# Patient Record
Sex: FEMALE | Race: WHITE | NOT HISPANIC OR LATINO
[De-identification: names, ages, dates, MRNs, and addresses within clinical notes are randomized per-mention and may not be internally consistent; named-entity substitution may affect disease eponyms.]

---

## 2022-05-31 ENCOUNTER — APPOINTMENT (OUTPATIENT)
Dept: ORTHOPEDIC SURGERY | Facility: CLINIC | Age: 72
End: 2022-05-31
Payer: MEDICARE

## 2022-05-31 VITALS — BODY MASS INDEX: 24.11 KG/M2 | HEIGHT: 66 IN | WEIGHT: 150 LBS

## 2022-05-31 PROCEDURE — 99214 OFFICE O/P EST MOD 30 MIN: CPT

## 2022-06-01 NOTE — DATA REVIEWED
[MRI] : MRI [I independently reviewed and interpreted images and report] : I independently reviewed and interpreted images and report [FreeTextEntry1] : RI lumbosacral spine done February 21, 2022 was reviewed. There is mild to moderate right paracentral disc/osteophyte at L2-3 with mild to moderate stenosis. Mild to moderate stenosis at L3-4. Mild stenosis at L4-5

## 2022-06-01 NOTE — DISCUSSION/SUMMARY
[Medication Risks Reviewed] : Medication risks reviewed [de-identified] : The patient did not want to resume formal PT\par She will continue home exercises\par Moist heat p.r.n.\par Tylenol p.r.n. Advil p.r.n.\par If she is not improving, she can try Medrol Dosepak.  She will stop Advil when taking Medrol Dosepak.  I explained to her how to take the medication.  Not to take any NSAIDs when taking this.  She may resume Advil the day after she finishes Medrol Dosepak\par \par Impression:\par Lumbosacral strain/spondylosis/stenosis

## 2022-06-01 NOTE — PHYSICAL EXAM
[Normal Mood and Affect] : normal mood and affect [Able to Communicate] : able to communicate [Well Developed] : well developed [Well Nourished] : well nourished [de-identified] : Normal gait [FreeTextEntry3] : Inspection lumbosacral spine normal [FreeTextEntry8] : Mild tenderness paraspinals left lumbosacral region [de-identified] : Straight leg raising is positive for lower back pain at 70° on the left [Bilateral] : foot and ankle bilaterally [] : no calf tenderness [2+] : posterior tibialis pulse: 2+

## 2022-06-01 NOTE — HISTORY OF PRESENT ILLNESS
[Lower back] : lower back [Left Leg] : left leg [Right Leg] : right leg [Gradual] : gradual [9] : 9 [Dull/Aching] : dull/aching [Radiating] : radiating [Sharp] : sharp [Intermittent] : intermittent [Leisure] : leisure [Retired] : Work status: retired [de-identified] : The patient has had recurrence of pain in her lower back over the past 10 days.  No injury.  She has mild to moderate pain when standing and walking.  Occasional pain radiating down her left leg.  No numbness or weakness.  No loss of bowel bladder control.  She has been doing home exercises.  Taking Advil p.r.n. [] : Post Surgical Visit: no [FreeTextEntry7] : B Thighs  [de-identified] : 2/28/2022 [de-identified] : Dr. Blanco

## 2022-06-20 ENCOUNTER — FORM ENCOUNTER (OUTPATIENT)
Age: 72
End: 2022-06-20

## 2022-06-21 ENCOUNTER — APPOINTMENT (OUTPATIENT)
Dept: ORTHOPEDIC SURGERY | Facility: CLINIC | Age: 72
End: 2022-06-21
Payer: MEDICARE

## 2022-06-21 ENCOUNTER — APPOINTMENT (OUTPATIENT)
Dept: MRI IMAGING | Facility: CLINIC | Age: 72
End: 2022-06-21
Payer: MEDICARE

## 2022-06-21 VITALS — BODY MASS INDEX: 24.11 KG/M2 | WEIGHT: 150 LBS | HEIGHT: 66 IN

## 2022-06-21 PROCEDURE — 72148 MRI LUMBAR SPINE W/O DYE: CPT | Mod: MH

## 2022-06-21 PROCEDURE — 99213 OFFICE O/P EST LOW 20 MIN: CPT

## 2022-06-21 RX ORDER — OMEPRAZOLE 40 MG/1
40 CAPSULE, DELAYED RELEASE ORAL
Qty: 30 | Refills: 0 | Status: ACTIVE | COMMUNITY
Start: 2022-05-05

## 2022-06-21 RX ORDER — BENAZEPRIL HYDROCHLORIDE AND HYDROCHLOROTHIAZIDE 10; 12.5 MG/1; MG/1
10-12.5 TABLET, FILM COATED ORAL
Qty: 90 | Refills: 0 | Status: ACTIVE | COMMUNITY
Start: 2022-05-29

## 2022-06-21 RX ORDER — IPRATROPIUM BROMIDE 42 UG/1
0.06 SPRAY NASAL
Qty: 15 | Refills: 0 | Status: ACTIVE | COMMUNITY
Start: 2022-06-03

## 2022-06-21 RX ORDER — METHYLPREDNISOLONE 4 MG/1
4 TABLET ORAL
Qty: 1 | Refills: 0 | Status: COMPLETED | COMMUNITY
Start: 2022-05-31 | End: 2022-06-21

## 2022-06-21 RX ORDER — MONTELUKAST 10 MG/1
10 TABLET, FILM COATED ORAL
Qty: 90 | Refills: 0 | Status: ACTIVE | COMMUNITY
Start: 2022-05-29

## 2022-06-21 RX ORDER — CONJUGATED ESTROGENS 0.62 MG/G
0.62 CREAM VAGINAL
Qty: 30 | Refills: 0 | Status: ACTIVE | COMMUNITY
Start: 2022-03-22

## 2022-06-21 RX ORDER — SIMVASTATIN 20 MG/1
20 TABLET, FILM COATED ORAL
Qty: 90 | Refills: 0 | Status: ACTIVE | COMMUNITY
Start: 2022-03-19

## 2022-06-21 NOTE — DISCUSSION/SUMMARY
[Medication Risks Reviewed] : Medication risks reviewed [de-identified] : The patient did not want to resume formal PT\par She will continue home exercises\par Moist heat p.r.n.\par Tylenol p.r.n. Advil p.r.n.\par Discontinue methocarbamol\par She can try cyclobenzaprine 5-10 mg q.8 h. p.r.n. pain/spasm.  Not to take this if she is driving or needs to be alert\par She will have new MRI lumbosacral spine\par She is tentatively scheduled for epidural injections with Dr. Mone Pa on June 29\par \par Impression:\par Lumbosacral strain/spondylosis/stenosis

## 2022-06-21 NOTE — PHYSICAL EXAM
[Normal Mood and Affect] : normal mood and affect [Able to Communicate] : able to communicate [Well Developed] : well developed [Well Nourished] : well nourished [de-identified] : Normal gait [FreeTextEntry3] : Inspection lumbosacral spine normal [FreeTextEntry8] : Mild tenderness paraspinals lumbosacral region [de-identified] : Straight leg raising is Negative bilaterally [Bilateral] : foot and ankle bilaterally [] : no calf tenderness [2+] : posterior tibialis pulse: 2+

## 2022-06-21 NOTE — HISTORY OF PRESENT ILLNESS
[Gradual] : gradual [9] : 9 [Radiating] : radiating [Intermittent] : intermittent [Leisure] : leisure [Rest] : rest [Walking] : walking [Retired] : Work status: retired [de-identified] : The patient says she has been worse since last visit.  She has mild to moderate pain in her lower back radiating down her legs.  Pain standing and walking.  Pain when lying down.  Occasional awakening from sleep at night.  No numbness or weakness or lower extremities.  Doing home exercises.  Minimal improvement after Medrol Dosepak.  Taking Advil p.r.n.  Taking methocarbamol at nighttime which has not been helping her.  She saw pain management physician Dr. Mone Pa.  She is scheduled for epidural injection June 29 [] : Post Surgical Visit: no [FreeTextEntry7] : B Legs

## 2022-06-22 ENCOUNTER — NON-APPOINTMENT (OUTPATIENT)
Age: 72
End: 2022-06-22

## 2022-06-23 ENCOUNTER — APPOINTMENT (OUTPATIENT)
Dept: ORTHOPEDIC SURGERY | Facility: CLINIC | Age: 72
End: 2022-06-23
Payer: MEDICARE

## 2022-06-23 VITALS — BODY MASS INDEX: 24.11 KG/M2 | WEIGHT: 150 LBS | HEIGHT: 66 IN

## 2022-06-23 PROCEDURE — 99214 OFFICE O/P EST MOD 30 MIN: CPT

## 2022-06-23 NOTE — HISTORY OF PRESENT ILLNESS
[Lower back] : lower back [Gradual] : gradual [5] : 5 [Dull/Aching] : dull/aching [Intermittent] : intermittent [Retired] : Work status: retired [de-identified] : The patient has continued pain in her lower back when standing and walking.  Pain when bending and sitting.  Pain radiating to her right leg.  No numbness or weakness in her lower extremities.  She had new MRI lumbosacral spine.  She has been taking Tylenol and Advil p.r.n.  She says cyclobenzaprine has not really been helping her [] : Post Surgical Visit: no [FreeTextEntry7] : B Legs  [de-identified] : Getting up from sitting [de-identified] : MRI

## 2022-06-23 NOTE — PHYSICAL EXAM
[Normal Mood and Affect] : normal mood and affect [Able to Communicate] : able to communicate [Well Developed] : well developed [Well Nourished] : well nourished [de-identified] : Normal gait [FreeTextEntry3] : Inspection lumbosacral spine normal [FreeTextEntry8] : Mild tenderness paraspinals lumbosacral region [de-identified] : Straight leg raising is Positive for lower back pain at 70° on the right [Bilateral] : foot and ankle bilaterally [] : no calf tenderness [2+] : posterior tibialis pulse: 2+

## 2022-06-23 NOTE — DATA REVIEWED
[MRI] : MRI [Lumbar Spine] : lumbar spine [FreeTextEntry1] : MRI of lumbosacral spine done June 21, 2022 was reviewed. There is moderate right paracentral disc extrusion reported as measuring 15 x 8 x 7 mm impinging thecal sac with moderate to severe stenosis at L2-3. Mild to moderate stenosis at L3-4. Mild stenosis at L4-5. Grade 1 retrolisthesis L5-S1

## 2022-06-23 NOTE — DISCUSSION/SUMMARY
[Medication Risks Reviewed] : Medication risks reviewed [de-identified] : MRI lumbosacral spine was discussed with the patient\par The patient did not want to resume formal PT\par She will continue home exercises\par Moist heat p.r.n.\par Tylenol p.r.n. Advil p.r.n.\par Discontinue cyclobenzaprine which has not been helping her\par She can try Tizanidine 2 mg 1-2 q.8 h. p.r.n. pain/spasm.  Not to take this if she is driving or needs to be alert\par She is tentatively scheduled for epidural injections with Dr. Mone Pa on June 29.  She says she is going to proceed with this\par Recommend she have spine consult.  She has seen Dr. Aleksandar Glasgow and Dr. Gary Barnett in the past.  I also gave her the name of Dr. Gary Wallace for consultation\par If for any reason she develops any significant neurologic changes, weakness and lower extremities, loss of bowel bladder control, advised to go to emergency room immediately\par \par Impression:\par Lumbosacral strain/spondylosis/stenosis

## 2022-07-20 ENCOUNTER — APPOINTMENT (OUTPATIENT)
Dept: ORTHOPEDIC SURGERY | Facility: CLINIC | Age: 72
End: 2022-07-20

## 2022-07-20 DIAGNOSIS — M54.17 RADICULOPATHY, LUMBOSACRAL REGION: ICD-10-CM

## 2022-07-20 PROCEDURE — 99205 OFFICE O/P NEW HI 60 MIN: CPT

## 2022-07-20 PROCEDURE — 99215 OFFICE O/P EST HI 40 MIN: CPT

## 2022-07-20 NOTE — HISTORY OF PRESENT ILLNESS
[Lower back] : lower back [Localized] : localized [Intermittent] : intermittent [Gradual] : gradual [Sudden] : sudden [6] : 6 [5] : 5 [Burning] : burning [Shooting] : shooting [Stabbing] : stabbing [Exercising] : exercising [de-identified] : 7/20/22: 71 yo LHD f here for the evaluation of her low back NC from DR Blanco - She has had chronic back pain for years treated by Dr Blanco. Pain worsened recently with pain shooting down her legs about 6-7 weeks ago without injury. Pain was with bending/standing/walking. Pain improved recently after LESI with pain management. No loss of b/b control. No n/t\par \par Had MBB (Dr Pa - Pain management) in 6/2021 with some relief. Had LESI in 7/2022 with 75-80% improvement. \par Had PT in 2/2022 with some relief. Also had MDP\par No prior spine surgeries/Chirocare/acupuncture\par \par Hx L knee TKA 2019\par Meniscus surgery in 2007 and 2009 \par Cataract surgery 2020\par \par Hx hypertension, hyperlipidemia, GERD and chronic rhinitis, asthma\par \par No hx diabetes/cancer\par \par Work: Retired, Volunteer at [employer] \par \par xrays reviewed:\par AP PELVIS - slight asymmtry of the hips\par L spine - spondylsosis with loss of disc hieght L4-S1\par \par  MRI L-spine: 6/21/22\par 1. L2-3: New right central and subarticular disc herniation/extrusion measuring approximately 15 x 8 x 7 mm\par impinging the right thecal sac causing moderate to severe central canal stenosis and effacing the right lateral \par recess. Herniated disc material extends 15 mm superiorly along the posterior aspect of L2. This is significantly \par increased in size from previous exam. Superimposed disc bulge which causes moderate bilateral neuroforaminal \par stenosis.\par 2. Additional herniations and bulges, detailed above. [] : Post Surgical Visit: no [FreeTextEntry1] : L spine  [FreeTextEntry5] : Pt has a hx of back pain, pt has seen dr salmon in the past, pt had mris done as well as a recent epidural with relief  [FreeTextEntry7] : down into both legs  [de-identified] : Dr Blanco  [de-identified] : x rays and mri done at [facility]  [de-identified] : None  [TWNoteComboBox1] : 90%

## 2022-07-20 NOTE — DISCUSSION/SUMMARY
[de-identified] : reviewed the case with her \par \par acute of chronic disc herniation - working through conservative tx with PT/INJECTOINS/MEDICATION - IF PAIN RECURRED WOULD consider for L2-3 laminectomy (possible fusion) - as it stands today - wouldn’t rec surgery

## 2022-07-20 NOTE — REVIEW OF SYSTEMS
[Joint Pain] : joint pain [Joint Stiffness] : joint stiffness [Negative] : Heme/Lymph Quality 130: Documentation Of Current Medications In The Medical Record: Current Medications Documented Detail Level: Detailed

## 2022-08-10 ENCOUNTER — APPOINTMENT (OUTPATIENT)
Dept: ORTHOPEDIC SURGERY | Facility: CLINIC | Age: 72
End: 2022-08-10

## 2022-08-10 VITALS — BODY MASS INDEX: 24.11 KG/M2 | HEIGHT: 66 IN | WEIGHT: 150 LBS

## 2022-08-10 DIAGNOSIS — M54.16 RADICULOPATHY, LUMBAR REGION: ICD-10-CM

## 2022-08-10 DIAGNOSIS — M51.26 OTHER INTERVERTEBRAL DISC DISPLACEMENT, LUMBAR REGION: ICD-10-CM

## 2022-08-10 PROCEDURE — 99214 OFFICE O/P EST MOD 30 MIN: CPT

## 2022-08-10 RX ORDER — ALBUTEROL SULFATE 90 UG/1
108 (90 BASE) INHALANT RESPIRATORY (INHALATION)
Qty: 8 | Refills: 0 | Status: ACTIVE | COMMUNITY
Start: 2022-08-07

## 2022-08-10 RX ORDER — CYCLOBENZAPRINE HYDROCHLORIDE 5 MG/1
5 TABLET, FILM COATED ORAL
Qty: 60 | Refills: 1 | Status: DISCONTINUED | COMMUNITY
Start: 2022-06-21 | End: 2022-08-10

## 2022-08-10 RX ORDER — TIZANIDINE 2 MG/1
2 TABLET ORAL
Qty: 60 | Refills: 1 | Status: DISCONTINUED | COMMUNITY
Start: 2022-06-23 | End: 2022-08-10

## 2022-08-10 NOTE — HISTORY OF PRESENT ILLNESS
[Lower back] : lower back [Gradual] : gradual [5] : 5 [Dull/Aching] : dull/aching [Intermittent] : intermittent [Leisure] : leisure [Rest] : rest [Standing] : standing [Walking] : walking [Retired] : Work status: retired [de-identified] : The patient had epidural injection with Dr. Mone Pa on June 29.  She has been feeling a little better.  She saw Dr. Barnett and Dr. Wallace.\par She is currently on prednisone for URI/bronchitis\par She has mild pain in her lower back with when standing and walking.  Occasional pain radiating to her legs.  No numbness or weakness.  Doing home exercises [] : Post Surgical Visit: no [FreeTextEntry7] : B Legs

## 2022-08-10 NOTE — IMAGING
[de-identified] : Normal gait\par \par Lumbosacral spine\par Inspection normal\par Mild tenderness paraspinals bilaterally\par Straight leg raising is mildly positive for lower back pain at 70° bilaterally\par Motor exam lower extremities normal\par \par Hips\par No pain with passive motion\par \par Legs\par No swelling\par Calves are soft and nontender\par Posterior tibial pulse 2+ bilaterally

## 2022-08-10 NOTE — HISTORY OF PRESENT ILLNESS
[Lower back] : lower back [Gradual] : gradual [5] : 5 [Dull/Aching] : dull/aching [Intermittent] : intermittent [Leisure] : leisure [Rest] : rest [Standing] : standing [Walking] : walking [Retired] : Work status: retired [de-identified] : The patient had epidural injection with Dr. Mone Pa on June 29.  She has been feeling a little better.  She saw Dr. Barnett and Dr. Wallace.\par She is currently on prednisone for URI/bronchitis\par She has mild pain in her lower back with when standing and walking.  Occasional pain radiating to her legs.  No numbness or weakness.  Doing home exercises [] : Post Surgical Visit: no [FreeTextEntry7] : B Legs

## 2022-08-10 NOTE — HISTORY OF PRESENT ILLNESS
[Lower back] : lower back [Gradual] : gradual [5] : 5 [Dull/Aching] : dull/aching [Intermittent] : intermittent [Leisure] : leisure [Rest] : rest [Standing] : standing [Walking] : walking [Retired] : Work status: retired [de-identified] : The patient had epidural injection with Dr. Mone Pa on June 29.  She has been feeling a little better.  She saw Dr. Barnett and Dr. Wallace.\par She is currently on prednisone for URI/bronchitis\par She has mild pain in her lower back with when standing and walking.  Occasional pain radiating to her legs.  No numbness or weakness.  Doing home exercises [] : Post Surgical Visit: no [FreeTextEntry7] : B Legs

## 2022-08-10 NOTE — IMAGING
[de-identified] : Normal gait\par \par Lumbosacral spine\par Inspection normal\par Mild tenderness paraspinals bilaterally\par Straight leg raising is mildly positive for lower back pain at 70° bilaterally\par Motor exam lower extremities normal\par \par Hips\par No pain with passive motion\par \par Legs\par No swelling\par Calves are soft and nontender\par Posterior tibial pulse 2+ bilaterally

## 2022-08-10 NOTE — DISCUSSION/SUMMARY
[Medication Risks Reviewed] : Medication risks reviewed [de-identified] : Various options were discussed with the patient\par She will continue home exercises.  She may try chiropractic treatment with her chiropractor Dr. Robledo\par She can resume Advil the day after she finishes prednisone\par Tylenol p.r.n.\par She can try gabapentin 300 mg to 600 mg q.h.s.\par Continue pain management followup with Dr. Mone Woo for possible second epidural\par If for any reason she develops any significant neurologic changes, weakness in lower extremities, loss of bowel bladder control, advised to go to emergency room immediately\par \par Impression:\par Number sacral strain/spondylosis/stenosis/disc herniation

## 2022-08-10 NOTE — DISCUSSION/SUMMARY
[Medication Risks Reviewed] : Medication risks reviewed [de-identified] : Various options were discussed with the patient\par She will continue home exercises.  She may try chiropractic treatment with her chiropractor Dr. Robledo\par She can resume Advil the day after she finishes prednisone\par Tylenol p.r.n.\par She can try gabapentin 300 mg to 600 mg q.h.s.\par Continue pain management followup with Dr. Mone Woo for possible second epidural\par If for any reason she develops any significant neurologic changes, weakness in lower extremities, loss of bowel bladder control, advised to go to emergency room immediately\par \par Impression:\par Number sacral strain/spondylosis/stenosis/disc herniation

## 2022-08-10 NOTE — DISCUSSION/SUMMARY
[Medication Risks Reviewed] : Medication risks reviewed [de-identified] : Various options were discussed with the patient\par She will continue home exercises.  She may try chiropractic treatment with her chiropractor Dr. Robledo\par She can resume Advil the day after she finishes prednisone\par Tylenol p.r.n.\par She can try gabapentin 300 mg to 600 mg q.h.s.\par Continue pain management followup with Dr. Mone Woo for possible second epidural\par If for any reason she develops any significant neurologic changes, weakness in lower extremities, loss of bowel bladder control, advised to go to emergency room immediately\par \par Impression:\par Number sacral strain/spondylosis/stenosis/disc herniation

## 2022-08-29 DIAGNOSIS — M47.817 SPONDYLOSIS W/OUT MYELOPATHY OR RADICULOPATHY, LUMBOSACRAL REGION: ICD-10-CM

## 2022-08-29 DIAGNOSIS — M48.061 SPINAL STENOSIS, LUMBAR REGION WITHOUT NEUROGENIC CLAUDICATION: ICD-10-CM

## 2022-09-12 ENCOUNTER — APPOINTMENT (OUTPATIENT)
Dept: ORTHOPEDIC SURGERY | Facility: CLINIC | Age: 72
End: 2022-09-12

## 2023-05-31 NOTE — IMAGING
[de-identified] : Normal gait\par \par Lumbosacral spine\par Inspection normal\par Mild tenderness paraspinals bilaterally\par Straight leg raising is mildly positive for lower back pain at 70° bilaterally\par Motor exam lower extremities normal\par \par Hips\par No pain with passive motion\par \par Legs\par No swelling\par Calves are soft and nontender\par Posterior tibial pulse 2+ bilaterally No respiratory distress. No stridor, Lungs sounds clear with good aeration bilaterally.

## 2023-06-27 ENCOUNTER — APPOINTMENT (OUTPATIENT)
Dept: ORTHOPEDIC SURGERY | Facility: CLINIC | Age: 73
End: 2023-06-27
Payer: MEDICARE

## 2023-06-27 VITALS — WEIGHT: 149 LBS | BODY MASS INDEX: 23.95 KG/M2 | HEIGHT: 66 IN

## 2023-06-27 PROCEDURE — 99214 OFFICE O/P EST MOD 30 MIN: CPT

## 2023-06-27 PROCEDURE — 73562 X-RAY EXAM OF KNEE 3: CPT | Mod: RT

## 2023-06-27 RX ORDER — AZITHROMYCIN 250 MG/1
250 TABLET, FILM COATED ORAL
Qty: 6 | Refills: 0 | Status: COMPLETED | COMMUNITY
Start: 2022-08-07 | End: 2023-06-27

## 2023-06-27 RX ORDER — GABAPENTIN 300 MG/1
300 CAPSULE ORAL
Qty: 60 | Refills: 1 | Status: COMPLETED | COMMUNITY
Start: 2022-08-10 | End: 2023-06-27

## 2023-06-27 RX ORDER — PREDNISONE 20 MG/1
20 TABLET ORAL
Qty: 5 | Refills: 0 | Status: COMPLETED | COMMUNITY
Start: 2022-08-07 | End: 2023-06-27

## 2023-06-27 NOTE — HISTORY OF PRESENT ILLNESS
[Gradual] : gradual [3] : 3 [Dull/Aching] : dull/aching [Intermittent] : intermittent [Stairs] : stairs [de-identified] : The patient has had increased pain right knee over the past several months.  She has mild to moderate pain standing and walking.  Pain with stairs and movie sign.  Doing home exercises.  Taking Tylenol as needed.  She did have some improvement in the past after Euflexxa injections.  She had left total knee replacement by Dr. Aman Flaherty and is doing well [] : Post Surgical Visit: no [FreeTextEntry7] : R Leg [de-identified] : 11/25/20 [de-identified] : Dr. Blanco [de-identified] : 11/25/20

## 2023-06-27 NOTE — DISCUSSION/SUMMARY
[de-identified] : Various options were discussed with the patient.  I discussed right total knee replacement.\par She says her symptoms have been getting worse.\par Recommend she schedule consultation with Dr. Francisco Wilkes\par Ice as needed\par Tylenol as needed\par Continue home exercises\par \par Impression:\par Severe osteoarthritis right knee

## 2023-06-27 NOTE — IMAGING
[de-identified] : Slight right antalgic gait\par \par Right knee\par No swelling\par Mild medial facet and joint line tenderness\par Passive range of motion 0 degrees to 120 degrees\par Ligaments are stable\par Quad strength 5/5\par \par Right leg\par No swelling\par Calf is soft and nontender\par Posterior tibial pulse 2+ [Right] : right knee [FreeTextEntry9] : Reviewed and interpreted.  Right knee AP standing, lateral, sunrise views-severe degenerative changes with narrowing of the medial compartment on AP standing view, spurring patellofemoral joint

## 2023-07-27 ENCOUNTER — OFFICE (OUTPATIENT)
Dept: URBAN - METROPOLITAN AREA CLINIC 109 | Facility: CLINIC | Age: 73
Setting detail: OPHTHALMOLOGY
End: 2023-07-27
Payer: MEDICARE

## 2023-07-27 DIAGNOSIS — H25.89: ICD-10-CM

## 2023-07-27 DIAGNOSIS — H16.223: ICD-10-CM

## 2023-07-27 DIAGNOSIS — H02.832: ICD-10-CM

## 2023-07-27 DIAGNOSIS — H02.831: ICD-10-CM

## 2023-07-27 DIAGNOSIS — D23.122: ICD-10-CM

## 2023-07-27 DIAGNOSIS — H02.834: ICD-10-CM

## 2023-07-27 DIAGNOSIS — H02.835: ICD-10-CM

## 2023-07-27 PROCEDURE — 92014 COMPRE OPH EXAM EST PT 1/>: CPT | Performed by: OPHTHALMOLOGY

## 2023-07-27 ASSESSMENT — REFRACTION_AUTOREFRACTION
OS_AXIS: 22
OD_SPHERE: +0.50
OD_CYLINDER: -0.75
OD_AXIS: 164
OS_SPHERE: +1.00
OS_CYLINDER: -1.50

## 2023-07-27 ASSESSMENT — REFRACTION_MANIFEST
OD_SPHERE: +0.50
OS_VPRISM_DIRECTION: BO
OD_ADD: +2.75
OD_CYLINDER: -0.75
OD_AXIS: 153
OS_VA1: 20/20-
OS_HPRISM: 3.5
OS_CYLINDER: -1.50
OS_AXIS: 25
OD_VA1: 20/20
OD_HPRISM: 3.5
OD_VPRISM_DIRECTION: BO
OS_ADD: +2.75
OS_SPHERE: +1.00

## 2023-07-27 ASSESSMENT — REFRACTION_CURRENTRX
OS_HPRISM: 3.5
OD_AXIS: 120
OD_SPHERE: +0.25
OD_CYLINDER: -1.00
OS_OVR_VA: 20/
OD_OVR_VA: 20/
OD_ADD: +2.75
OS_HPRISM_DIRECTION: BO
OS_SPHERE: +0.75
OD_HPRISM_DIRECTION: BO
OD_HPRISM: 3.5
OS_CYLINDER: -1.00
OS_ADD: +2.75
OS_AXIS: 60

## 2023-07-27 ASSESSMENT — LID POSITION - DERMATOCHALASIS
OD_DERMATOCHALASIS: RLL RUL 1+
OS_DERMATOCHALASIS: LLL LUL 1+

## 2023-07-27 ASSESSMENT — SUPERFICIAL PUNCTATE KERATITIS (SPK)
OD_SPK: ABSENT
OS_SPK: ABSENT

## 2023-07-27 ASSESSMENT — KERATOMETRY
OS_K2POWER_DIOPTERS: 43.75
OS_AXISANGLE_DEGREES: 113
OD_K2POWER_DIOPTERS: 43.62
OS_K1POWER_DIOPTERS: 42.25
OD_K1POWER_DIOPTERS: 42.87
OD_AXISANGLE_DEGREES: 71

## 2023-07-27 ASSESSMENT — SPHEQUIV_DERIVED
OD_SPHEQUIV: 0.125
OD_SPHEQUIV: 0.125
OS_SPHEQUIV: 0.25
OS_SPHEQUIV: 0.25

## 2023-07-27 ASSESSMENT — AXIALLENGTH_DERIVED
OD_AL: 23.637
OD_AL: 23.637
OS_AL: 23.6785
OS_AL: 23.6785

## 2023-07-27 ASSESSMENT — VISUAL ACUITY
OD_BCVA: 20/20
OS_BCVA: 20/20

## 2023-07-27 ASSESSMENT — CONFRONTATIONAL VISUAL FIELD TEST (CVF)
OD_FINDINGS: FULL
OS_FINDINGS: FULL

## 2023-07-27 ASSESSMENT — TONOMETRY
OS_IOP_MMHG: 14
OD_IOP_MMHG: 13

## 2023-08-10 ENCOUNTER — APPOINTMENT (OUTPATIENT)
Dept: ORTHOPEDIC SURGERY | Facility: CLINIC | Age: 73
End: 2023-08-10
Payer: MEDICARE

## 2023-08-10 DIAGNOSIS — Z78.9 OTHER SPECIFIED HEALTH STATUS: ICD-10-CM

## 2023-08-10 DIAGNOSIS — M48.00 SPINAL STENOSIS, SITE UNSPECIFIED: ICD-10-CM

## 2023-08-10 DIAGNOSIS — J45.909 UNSPECIFIED ASTHMA, UNCOMPLICATED: ICD-10-CM

## 2023-08-10 DIAGNOSIS — E78.00 PURE HYPERCHOLESTEROLEMIA, UNSPECIFIED: ICD-10-CM

## 2023-08-10 DIAGNOSIS — Z87.891 PERSONAL HISTORY OF NICOTINE DEPENDENCE: ICD-10-CM

## 2023-08-10 DIAGNOSIS — M17.11 UNILATERAL PRIMARY OSTEOARTHRITIS, RIGHT KNEE: ICD-10-CM

## 2023-08-10 DIAGNOSIS — M19.90 UNSPECIFIED OSTEOARTHRITIS, UNSPECIFIED SITE: ICD-10-CM

## 2023-08-10 DIAGNOSIS — I10 ESSENTIAL (PRIMARY) HYPERTENSION: ICD-10-CM

## 2023-08-10 PROCEDURE — 99213 OFFICE O/P EST LOW 20 MIN: CPT | Mod: 25

## 2023-08-10 PROCEDURE — 20610 DRAIN/INJ JOINT/BURSA W/O US: CPT | Mod: RT

## 2023-08-10 NOTE — PHYSICAL EXAM
[Right] : right knee [de-identified] : Constitutional: The patient appears well developed, well nourished. Examination of patients ability to communicate functionally was normal.       Neurologic: Coordination is normal. Alert and oriented to time, place and person. No evidence of mood disorder, calm affect.        RIGHT     KNEE  : Inspection of the knee is as follows: moderate effusion. no ecchymosis, no streaking, no erythema, no atrophy, no deformities of the quad tendon and no deformities of patellar tendon.   VARUS ALIGNMENT     Palpation of the knee is as follows: medial joint line tenderness,  MILD  medial facet of patella tenderness, lateral facet of patella tenderness and crepitus. no palpable masses and no increased warmth.       Knee Range of Motion is as follows in degrees:        Extension: 2    Flexion: 125        Strength examination of the knee is as follows:    Quadriceps strength is 5/5    Hamstring strength is 5/5       Ligament Stability and Special Test ligamentously stable, negative anterior draw, negative Lachman test, negative posterior draw and no varus or valgus instability.    negative McMurrays test and able to do active straight leg raise without an extensor lag.       Neurological examination of the knee is as follows: light touch is intact throughout.       Gait and function is as follows: mildly antalgic gait.  [FreeTextEntry9] : ap/lat/sunrise taken approx 2 mos ago show mod to severe medial joint space narrowing spurring, and patellar spurring.

## 2023-08-10 NOTE — DISCUSSION/SUMMARY
[de-identified] : DIscussed options were discussed including MRI Right knee CSI, lubricant injection,  TKA vs MUKA  Large joint injection was performed of the right  knee. The indication for this procedure was pain, inflammation and x-ray evidence of Osteoarthritis on this or prior visit. The site was prepped with betadine and sterile technique used.An injection of Lidocaine 5cc of 1%  was used Betamethasone (Celestone) 1cc of 6mg.  Patient tolerated procedure well. Patient was advised to call if redness, pain or fever occur and apply ice for 15 minutes out of every hour for the next 12-24 hours as tolerated.   Patient has tried OTC's including aspirin, Ibuprofen, Aleve, etc or prescription NSAIDS, and/or exercises at home and/or physical therapy without satisfactory response, patient had decreased mobility in the joint and the risks benefits, and alternatives have been discussed, and verbal consent was obtained.  Pt to return in 1 month if needed Entered by Becky Lakhani acting as scribe. Dr. Wilkes Attestation The documentation recorded by the scribe, in my presence, accurately reflects the service I, Dr. Wilkes, personally performed, and the decisions made by me with my edits as appropriate.

## 2023-08-10 NOTE — HISTORY OF PRESENT ILLNESS
[de-identified] : Patient is here today for her RT Knee. Pt state NKI, it is something that developed over time.  Right knee scoped by Dr Blanco circa 2007 or 2009 which helped for awhile.  She had Left TKA 4 yrs Dr Flaherty at [facility].  She states the knee is doing well.  She has had Right knee CSI? and lubricant injections with no significant relief. She notes most pain medial aspect of the knee and she cant hop on the knee and she feels its weak.  She wants to consider trx before she becomes unable due to age or function.

## 2024-04-02 ENCOUNTER — OFFICE (OUTPATIENT)
Dept: URBAN - METROPOLITAN AREA CLINIC 109 | Facility: CLINIC | Age: 74
Setting detail: OPHTHALMOLOGY
End: 2024-04-02
Payer: MEDICARE

## 2024-04-02 DIAGNOSIS — H02.834: ICD-10-CM

## 2024-04-02 DIAGNOSIS — H25.89: ICD-10-CM

## 2024-04-02 DIAGNOSIS — H02.832: ICD-10-CM

## 2024-04-02 DIAGNOSIS — H16.222: ICD-10-CM

## 2024-04-02 DIAGNOSIS — D23.122: ICD-10-CM

## 2024-04-02 DIAGNOSIS — H02.835: ICD-10-CM

## 2024-04-02 DIAGNOSIS — H16.221: ICD-10-CM

## 2024-04-02 DIAGNOSIS — H16.223: ICD-10-CM

## 2024-04-02 DIAGNOSIS — H02.831: ICD-10-CM

## 2024-04-02 PROCEDURE — 83861 MICROFLUID ANALY TEARS: CPT | Mod: QW,LT | Performed by: OPHTHALMOLOGY

## 2024-04-02 PROCEDURE — 83861 MICROFLUID ANALY TEARS: CPT | Mod: QW,RT | Performed by: OPHTHALMOLOGY

## 2024-04-02 PROCEDURE — 99213 OFFICE O/P EST LOW 20 MIN: CPT | Performed by: OPHTHALMOLOGY

## 2024-04-02 ASSESSMENT — LID POSITION - DERMATOCHALASIS
OS_DERMATOCHALASIS: LLL LUL 1+
OD_DERMATOCHALASIS: RLL RUL 1+

## 2024-07-09 ENCOUNTER — OFFICE (OUTPATIENT)
Dept: URBAN - METROPOLITAN AREA CLINIC 109 | Facility: CLINIC | Age: 74
Setting detail: OPHTHALMOLOGY
End: 2024-07-09
Payer: MEDICARE

## 2024-07-09 DIAGNOSIS — H25.89: ICD-10-CM

## 2024-07-09 DIAGNOSIS — H02.832: ICD-10-CM

## 2024-07-09 DIAGNOSIS — H02.831: ICD-10-CM

## 2024-07-09 DIAGNOSIS — H02.834: ICD-10-CM

## 2024-07-09 DIAGNOSIS — D23.122: ICD-10-CM

## 2024-07-09 DIAGNOSIS — H02.835: ICD-10-CM

## 2024-07-09 DIAGNOSIS — H16.223: ICD-10-CM

## 2024-07-09 PROCEDURE — 99213 OFFICE O/P EST LOW 20 MIN: CPT | Performed by: OPHTHALMOLOGY

## 2024-07-09 ASSESSMENT — CONFRONTATIONAL VISUAL FIELD TEST (CVF)
OD_FINDINGS: FULL
OS_FINDINGS: FULL

## 2024-07-09 ASSESSMENT — LID POSITION - DERMATOCHALASIS
OD_DERMATOCHALASIS: RLL RUL 1+
OS_DERMATOCHALASIS: LLL LUL 1+

## 2024-07-11 ENCOUNTER — APPOINTMENT (OUTPATIENT)
Dept: ORTHOPEDIC SURGERY | Facility: CLINIC | Age: 74
End: 2024-07-11
Payer: MEDICARE

## 2024-07-11 VITALS — WEIGHT: 149 LBS | BODY MASS INDEX: 23.95 KG/M2 | HEIGHT: 66 IN

## 2024-07-11 DIAGNOSIS — M54.16 RADICULOPATHY, LUMBAR REGION: ICD-10-CM

## 2024-07-11 PROCEDURE — 73502 X-RAY EXAM HIP UNI 2-3 VIEWS: CPT

## 2024-07-11 PROCEDURE — 72100 X-RAY EXAM L-S SPINE 2/3 VWS: CPT

## 2024-07-11 PROCEDURE — 99214 OFFICE O/P EST MOD 30 MIN: CPT

## 2024-07-11 RX ORDER — VENLAFAXINE HCL 75 MG
75 TABLET ORAL
Refills: 0 | Status: ACTIVE | COMMUNITY

## 2024-07-11 RX ORDER — LOSARTAN POTASSIUM AND HYDROCHLOROTHIAZIDE 12.5; 1 MG/1; MG/1
100-12.5 TABLET ORAL
Refills: 0 | Status: ACTIVE | COMMUNITY

## 2024-07-15 ENCOUNTER — APPOINTMENT (OUTPATIENT)
Dept: MRI IMAGING | Facility: CLINIC | Age: 74
End: 2024-07-15
Payer: MEDICARE

## 2024-07-15 PROCEDURE — 72148 MRI LUMBAR SPINE W/O DYE: CPT | Mod: MH

## 2024-07-23 ENCOUNTER — APPOINTMENT (OUTPATIENT)
Dept: ORTHOPEDIC SURGERY | Facility: CLINIC | Age: 74
End: 2024-07-23
Payer: MEDICARE

## 2024-07-23 VITALS — HEIGHT: 66 IN | WEIGHT: 149 LBS | BODY MASS INDEX: 23.95 KG/M2

## 2024-07-23 DIAGNOSIS — M47.817 SPONDYLOSIS W/OUT MYELOPATHY OR RADICULOPATHY, LUMBOSACRAL REGION: ICD-10-CM

## 2024-07-23 DIAGNOSIS — M48.061 SPINAL STENOSIS, LUMBAR REGION WITHOUT NEUROGENIC CLAUDICATION: ICD-10-CM

## 2024-07-23 DIAGNOSIS — S39.012D STRAIN OF MUSCLE, FASCIA AND TENDON OF LOWER BACK, SUBSEQUENT ENCOUNTER: ICD-10-CM

## 2024-07-23 PROCEDURE — 99214 OFFICE O/P EST MOD 30 MIN: CPT

## 2024-07-23 NOTE — HISTORY OF PRESENT ILLNESS
[Lower back] : lower back [Right Leg] : right leg [Gradual] : gradual [9] : 9 [Radiating] : radiating [Intermittent] : intermittent [Leisure] : leisure [Rest] : rest [Walking] : walking [Retired] : Work status: retired [de-identified] : The patient has continued mild to moderate pain in her right lower back region.  Pain with change in position.  No radicular complaints.  No numbness or weakness in her lower extremities.  She had MRI lumbosacral spine [] : Post Surgical Visit: no [de-identified] : 7/11/24 [FreeTextEntry7] : B Legs [de-identified] : Dr. Blanco  [de-identified] : 7/8/2024 [de-identified] : MRI

## 2024-07-23 NOTE — DISCUSSION/SUMMARY
[Medication Risks Reviewed] : Medication risks reviewed [de-identified] : MRI lumbosacral spine was discussed with the patient Continue PT at [facility] She will continue home exercises Moist heat p.r.n. Tylenol p.r.n. Advil p.r.n. The patient has had epidural injections in the past with Dr. Mone Pa.  I discussed possible follow-up consult after MRI is performed If for any reason she develops any significant neurologic changes, weakness in the lower extremities, loss of bowel or bladder control, advised to go to emergency room immediately Follow-up 2 months if any persistent problems, otherwise prn  Impression: Lumbosacral strain/spondylosis/stenosis

## 2024-07-23 NOTE — IMAGING
[FreeTextEntry1] : Reviewed and interpreted.  Lumbosacral spine AP and lateral views-moderate to space narrowing at L4-5.  Severe to space narrowing at L5-S1 with grade 1 retrolisthesis [de-identified] : Reviewed and interpreted.  Pelvis AP with lateral right hip-minimal degenerative changes of the hips bilaterally [de-identified] : Slight right antalgic gait  Lumbosacral spine Inspection-normal Tenderness-mild tenderness right lumbosacral region Straight leg raising-negative bilaterally Motor exam lower extremities-normal  Right hip Full painless passive range of motion. No tenderness  Left hip Full painless passive range of motion. No tenderness  Right knee No swelling.  Healed midline incision Passive range of motion 3 degrees to 110 degrees Ligaments are stable  Both legs No swelling Calves are soft and nontender Posterior tibial pulse 2+ bilaterally

## 2024-07-23 NOTE — DATA REVIEWED
[MRI] : MRI [Lumbar Spine] : lumbar spine [I independently reviewed and interpreted images and report] : I independently reviewed and interpreted images and report [FreeTextEntry1] : MRI lumbosacral spine done July 15, 2024 was reviewed. There is multilevel degenerative disc disease. Moderate stenosis L2-3 and L3-4. Mild to moderate stenosis L4-5 with reactive endplate changes. Mild central disc herniation at L5-S1, also reported as epidural lipomatosis.

## 2024-08-14 ENCOUNTER — OFFICE (OUTPATIENT)
Dept: URBAN - METROPOLITAN AREA CLINIC 104 | Facility: CLINIC | Age: 74
Setting detail: OPHTHALMOLOGY
End: 2024-08-14
Payer: MEDICARE

## 2024-08-14 ENCOUNTER — RX ONLY (RX ONLY)
Age: 74
End: 2024-08-14

## 2024-08-14 DIAGNOSIS — H02.825: ICD-10-CM

## 2024-08-14 PROCEDURE — 67840 REMOVE EYELID LESION: CPT | Mod: LT | Performed by: OPHTHALMOLOGY

## 2024-08-14 PROCEDURE — 92285 EXTERNAL OCULAR PHOTOGRAPHY: CPT | Performed by: OPHTHALMOLOGY

## 2024-08-14 ASSESSMENT — LID POSITION - DERMATOCHALASIS
OD_DERMATOCHALASIS: RLL RUL 1+
OS_DERMATOCHALASIS: LLL LUL 1+

## 2025-02-06 ENCOUNTER — OFFICE (OUTPATIENT)
Dept: URBAN - METROPOLITAN AREA CLINIC 109 | Facility: CLINIC | Age: 75
Setting detail: OPHTHALMOLOGY
End: 2025-02-06
Payer: MEDICARE

## 2025-02-06 DIAGNOSIS — H02.825: ICD-10-CM

## 2025-02-06 PROCEDURE — 99213 OFFICE O/P EST LOW 20 MIN: CPT | Performed by: OPHTHALMOLOGY

## 2025-02-06 ASSESSMENT — SUPERFICIAL PUNCTATE KERATITIS (SPK)
OS_SPK: T 1+
OD_SPK: T

## 2025-02-06 ASSESSMENT — VISUAL ACUITY
OS_BCVA: 20/20-1
OD_BCVA: 20/20-2

## 2025-02-06 ASSESSMENT — REFRACTION_AUTOREFRACTION
OS_SPHERE: +1.00
OD_CYLINDER: -0.75
OS_CYLINDER: -1.50
OD_AXIS: 160
OS_AXIS: 40
OD_SPHERE: +0.75

## 2025-02-06 ASSESSMENT — LID POSITION - DERMATOCHALASIS
OD_DERMATOCHALASIS: RLL RUL 1+
OS_DERMATOCHALASIS: LLL LUL 1+

## 2025-02-06 ASSESSMENT — KERATOMETRY
OD_K2POWER_DIOPTERS: 43.62
OS_AXISANGLE_DEGREES: 113
OS_K2POWER_DIOPTERS: 43.75
OD_K1POWER_DIOPTERS: 42.87
OS_K1POWER_DIOPTERS: 42.25
OD_AXISANGLE_DEGREES: 71

## 2025-04-30 ENCOUNTER — APPOINTMENT (OUTPATIENT)
Dept: ORTHOPEDIC SURGERY | Facility: CLINIC | Age: 75
End: 2025-04-30
Payer: MEDICARE

## 2025-04-30 DIAGNOSIS — M47.817 SPONDYLOSIS W/OUT MYELOPATHY OR RADICULOPATHY, LUMBOSACRAL REGION: ICD-10-CM

## 2025-04-30 DIAGNOSIS — S39.012D STRAIN OF MUSCLE, FASCIA AND TENDON OF LOWER BACK, SUBSEQUENT ENCOUNTER: ICD-10-CM

## 2025-04-30 PROCEDURE — 72100 X-RAY EXAM L-S SPINE 2/3 VWS: CPT

## 2025-04-30 PROCEDURE — 99214 OFFICE O/P EST MOD 30 MIN: CPT

## 2025-04-30 PROCEDURE — 73502 X-RAY EXAM HIP UNI 2-3 VIEWS: CPT

## 2025-04-30 RX ORDER — TIZANIDINE 2 MG/1
2 TABLET ORAL
Qty: 60 | Refills: 1 | Status: ACTIVE | COMMUNITY
Start: 2025-04-30 | End: 1900-01-01

## 2025-04-30 RX ORDER — METHYLPREDNISOLONE 4 MG/1
4 TABLET ORAL
Qty: 1 | Refills: 0 | Status: ACTIVE | COMMUNITY
Start: 2025-04-30 | End: 1900-01-01

## 2025-05-01 ENCOUNTER — NON-APPOINTMENT (OUTPATIENT)
Age: 75
End: 2025-05-01

## 2025-07-24 ENCOUNTER — APPOINTMENT (OUTPATIENT)
Dept: ORTHOPEDIC SURGERY | Facility: CLINIC | Age: 75
End: 2025-07-24
Payer: MEDICARE

## 2025-07-24 VITALS — HEIGHT: 66 IN | BODY MASS INDEX: 23.95 KG/M2 | WEIGHT: 149 LBS

## 2025-07-24 DIAGNOSIS — S16.1XXA STRAIN OF MUSCLE, FASCIA AND TENDON AT NECK LEVEL, INITIAL ENCOUNTER: ICD-10-CM

## 2025-07-24 DIAGNOSIS — M47.812 SPONDYLOSIS W/OUT MYELOPATHY OR RADICULOPATHY, CERVICAL REGION: ICD-10-CM

## 2025-07-24 PROCEDURE — 99213 OFFICE O/P EST LOW 20 MIN: CPT

## 2025-07-24 PROCEDURE — 72050 X-RAY EXAM NECK SPINE 4/5VWS: CPT

## 2025-07-28 ENCOUNTER — APPOINTMENT (OUTPATIENT)
Dept: MRI IMAGING | Facility: CLINIC | Age: 75
End: 2025-07-28
Payer: MEDICARE

## 2025-07-28 PROCEDURE — 72141 MRI NECK SPINE W/O DYE: CPT

## 2025-07-29 ENCOUNTER — NON-APPOINTMENT (OUTPATIENT)
Age: 75
End: 2025-07-29

## 2025-07-30 ENCOUNTER — NON-APPOINTMENT (OUTPATIENT)
Age: 75
End: 2025-07-30

## 2025-08-19 ENCOUNTER — OFFICE (OUTPATIENT)
Dept: URBAN - METROPOLITAN AREA CLINIC 109 | Facility: CLINIC | Age: 75
Setting detail: OPHTHALMOLOGY
End: 2025-08-19
Payer: MEDICARE

## 2025-08-19 DIAGNOSIS — H02.825: ICD-10-CM

## 2025-08-19 DIAGNOSIS — H25.89: ICD-10-CM

## 2025-08-19 DIAGNOSIS — H02.831: ICD-10-CM

## 2025-08-19 DIAGNOSIS — H02.832: ICD-10-CM

## 2025-08-19 DIAGNOSIS — H02.834: ICD-10-CM

## 2025-08-19 DIAGNOSIS — H16.223: ICD-10-CM

## 2025-08-19 DIAGNOSIS — H53.2: ICD-10-CM

## 2025-08-19 DIAGNOSIS — H02.835: ICD-10-CM

## 2025-08-19 PROCEDURE — 92014 COMPRE OPH EXAM EST PT 1/>: CPT | Performed by: OPHTHALMOLOGY

## 2025-08-19 ASSESSMENT — REFRACTION_MANIFEST
OS_AXIS: 25
OS_CYLINDER: -1.00
OD_CYLINDER: -0.75
OD_VA1: 20/20
OD_SPHERE: +1.00
OD_AXIS: 155
OS_SPHERE: +0.75
OS_VA1: 20/20

## 2025-08-19 ASSESSMENT — TONOMETRY
OD_IOP_MMHG: 13
OS_IOP_MMHG: 16
OS_IOP_MMHG: 13
OD_IOP_MMHG: 11

## 2025-08-19 ASSESSMENT — KERATOMETRY
OS_K2POWER_DIOPTERS: 43.75
OS_K1POWER_DIOPTERS: 42.25
OD_AXISANGLE_DEGREES: 71
OD_K2POWER_DIOPTERS: 43.62
OS_AXISANGLE_DEGREES: 113
OD_K1POWER_DIOPTERS: 42.87

## 2025-08-19 ASSESSMENT — LID POSITION - DERMATOCHALASIS
OD_DERMATOCHALASIS: RLL RUL 1+
OS_DERMATOCHALASIS: LLL LUL 1+

## 2025-08-19 ASSESSMENT — REFRACTION_AUTOREFRACTION
OD_AXIS: 150
OS_AXIS: 40
OD_CYLINDER: -0.50
OD_SPHERE: +0.75
OS_SPHERE: +1.00
OS_CYLINDER: -1.25

## 2025-08-19 ASSESSMENT — REFRACTION_CURRENTRX
OS_OVR_VA: 20/
OD_AXIS: 175
OS_AXIS: 30
OS_VPRISM_DIRECTION: PROGS
OD_CYLINDER: -0.25
OS_SPHERE: +1.50
OD_SPHERE: +0.50
OD_VPRISM_DIRECTION: PROGS
OD_ADD: +2.00
OS_CYLINDER: -2.00
OS_ADD: +2.00
OD_OVR_VA: 20/

## 2025-08-19 ASSESSMENT — VISUAL ACUITY
OS_BCVA: 20/20
OD_BCVA: 20/20-2

## 2025-08-19 ASSESSMENT — CONFRONTATIONAL VISUAL FIELD TEST (CVF)
OS_FINDINGS: FULL
OD_FINDINGS: FULL

## 2025-08-19 ASSESSMENT — SUPERFICIAL PUNCTATE KERATITIS (SPK)
OD_SPK: T
OS_SPK: T 1+

## 2025-09-17 ENCOUNTER — APPOINTMENT (OUTPATIENT)
Dept: ORTHOPEDIC SURGERY | Facility: CLINIC | Age: 75
End: 2025-09-17
Payer: MEDICARE

## 2025-09-17 VITALS — WEIGHT: 149 LBS | HEIGHT: 66 IN | BODY MASS INDEX: 23.95 KG/M2

## 2025-09-17 DIAGNOSIS — M47.812 SPONDYLOSIS W/OUT MYELOPATHY OR RADICULOPATHY, CERVICAL REGION: ICD-10-CM

## 2025-09-17 DIAGNOSIS — S16.1XXD STRAIN OF MUSCLE, FASCIA AND TENDON AT NECK LEVEL, SUBSEQUENT ENCOUNTER: ICD-10-CM

## 2025-09-17 PROCEDURE — 99214 OFFICE O/P EST MOD 30 MIN: CPT
